# Patient Record
Sex: FEMALE | Race: BLACK OR AFRICAN AMERICAN | NOT HISPANIC OR LATINO | URBAN - METROPOLITAN AREA
[De-identification: names, ages, dates, MRNs, and addresses within clinical notes are randomized per-mention and may not be internally consistent; named-entity substitution may affect disease eponyms.]

---

## 2023-12-26 ENCOUNTER — EMERGENCY (EMERGENCY)
Facility: HOSPITAL | Age: 68
LOS: 0 days | Discharge: HOME | End: 2023-12-26
Payer: SELF-PAY

## 2023-12-26 VITALS
DIASTOLIC BLOOD PRESSURE: 98 MMHG | SYSTOLIC BLOOD PRESSURE: 175 MMHG | TEMPERATURE: 98 F | RESPIRATION RATE: 16 BRPM | OXYGEN SATURATION: 100 % | HEART RATE: 80 BPM | WEIGHT: 147.05 LBS | HEIGHT: 63 IN

## 2023-12-26 DIAGNOSIS — S00.81XA ABRASION OF OTHER PART OF HEAD, INITIAL ENCOUNTER: ICD-10-CM

## 2023-12-26 DIAGNOSIS — S00.31XA ABRASION OF NOSE, INITIAL ENCOUNTER: ICD-10-CM
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
## 2023-12-26 DIAGNOSIS — R51.9 HEADACHE, UNSPECIFIED: ICD-10-CM

## 2023-12-26 DIAGNOSIS — Z23 ENCOUNTER FOR IMMUNIZATION: ICD-10-CM

## 2023-12-26 PROCEDURE — 70450 CT HEAD/BRAIN W/O DYE: CPT | Mod: 26,MA

## 2023-12-26 PROCEDURE — 99284 EMERGENCY DEPT VISIT MOD MDM: CPT

## 2023-12-26 PROCEDURE — 70486 CT MAXILLOFACIAL W/O DYE: CPT | Mod: 26,MA

## 2023-12-26 RX ORDER — ACETAMINOPHEN 500 MG
650 TABLET ORAL ONCE
Refills: 0 | Status: COMPLETED | OUTPATIENT
Start: 2023-12-26 | End: 2023-12-26

## 2023-12-26 RX ORDER — TETANUS TOXOID, REDUCED DIPHTHERIA TOXOID AND ACELLULAR PERTUSSIS VACCINE, ADSORBED 5; 2.5; 8; 8; 2.5 [IU]/.5ML; [IU]/.5ML; UG/.5ML; UG/.5ML; UG/.5ML
0.5 SUSPENSION INTRAMUSCULAR ONCE
Refills: 0 | Status: COMPLETED | OUTPATIENT
Start: 2023-12-26 | End: 2023-12-26

## 2023-12-26 RX ADMIN — TETANUS TOXOID, REDUCED DIPHTHERIA TOXOID AND ACELLULAR PERTUSSIS VACCINE, ADSORBED 0.5 MILLILITER(S): 5; 2.5; 8; 8; 2.5 SUSPENSION INTRAMUSCULAR at 17:43

## 2023-12-26 RX ADMIN — Medication 650 MILLIGRAM(S): at 17:44

## 2023-12-26 NOTE — ED PROVIDER NOTE - NSFOLLOWUPINSTRUCTIONS_ED_ALL_ED_FT
Rest, drink plenty of fluids.  Advance activity as tolerated.  Continue all previously prescribed medications as directed.  Follow up with your primary care physician in 48-72 hours- bring copies of your results.  Return to the ER for worsening or persistent symptoms, and/or ANY NEW OR CONCERNING SYMPTOMS. If you have issues obtaining follow up, please call: [phone] to obtain a doctor or specialist who takes your insurance in your area.  You may call [phone] to make an appointment with the internal medicine clinic. Rest, drink plenty of fluids.  Advance activity as tolerated.  Continue all previously prescribed medications as directed.  Follow up with your primary care physician in 48-72 hours- bring copies of your results.  Return to the ER for worsening or persistent symptoms, and/or ANY NEW OR CONCERNING SYMPTOMS. If you have issues obtaining follow up, please call: [phone] to obtain a doctor or specialist who takes your insurance in your area.  You may call [phone] to make an appointment with the internal medicine clinic.

## 2023-12-26 NOTE — ED PROVIDER NOTE - OBJECTIVE STATEMENT
69 y/o female with no PMH here with head/facial injury s/p mechanical fall today. Pt states she slip and fell forward hitting her face. Denies LOC. Pt reports having headache. Pt states she had some nose bleeding but it has resolved. Denies dizziness, nausea, vomiting, vision changes or other injuries. Pt is not on any blood thinners. Pt denies chest pain, dyspnea, abdominal pain.

## 2023-12-26 NOTE — ED PROVIDER NOTE - CLINICAL SUMMARY MEDICAL DECISION MAKING FREE TEXT BOX
67 y/o female with no PMH s/p mechanical fall today with head/facial injury. Vs stable.   Will obtain ct head and ct facial r/o bleed or fracture and update tetanus and tylenol ordered for pain. 69 y/o female with no PMH s/p mechanical fall today with head/facial injury. Vs stable.   Will obtain ct head and ct facial r/o bleed or fracture and update tetanus and tylenol ordered for pain. 67 y/o female with no PMH s/p mechanical fall today with head/facial injury. Vs stable.   Will obtain ct head and ct facial r/o bleed or fracture and update tetanus and tylenol ordered for pain.    ct head and ct facial no acute findings.     Abrasion was cleaned and bacitracin applied.   Pt NAD, ambulatory with steady gait. No neurological deficits.   pt stable to be discharged home and follow up with PMD .

## 2023-12-26 NOTE — ED ADULT NURSE NOTE - OBJECTIVE STATEMENT
as per patient " today I fell on the side walk, hit my head and face. Denies LOC, noted swelling, redness and abrasion to forehead, nose, and above upper lip"

## 2023-12-26 NOTE — ED PROVIDER NOTE - PHYSICAL EXAMINATION
GEN: Awake, alert, interactive, NAD.  HEAD AND NECK: NC/AT. Airway patent. Neck supple. No c spine tenderness.  (+) mild swelling to the forehead with abrasion   EYES:  Clear b/l. EOMI. PERRL.   ENT: Moist mucus membranes. Nose: (+) mild nasal swelling, (-) active bleeding, (-) septal hematoma. Mouth: Teeth immobile and intact.   CARDIAC: Regular rate, regular rhythm. No evident pedal edema.    RESP/CHEST: Normal respiratory effort with no use of accessory muscles or retractions. Clear throughout on auscultation.  ABD: soft, non-distended, non-tender. No rebound, no guarding.   BACK: No midline spinal TTP. No CVAT.   EXTREMITIES: Moving all extremities with no apparent deformities.   SKIN: Warm, dry, intact normal color. No rash.   NEURO: AOx3, CN II-XII grossly intact, no focal deficits. Ambulatory with steady gait.   PSYCH: Appropriate mood and affect. GEN: Awake, alert, interactive, NAD.  HEAD AND NECK: NC/AT. Airway patent. Neck supple. No c spine tenderness.  (+) mild swelling to the forehead with abrasions   EYES:  Clear b/l. EOMI. PERRL.   ENT: Moist mucus membranes. Nose: (+) mild nasal swelling with abrasions, (-) active bleeding, (-) septal hematoma. Mouth: Teeth immobile and intact.   CARDIAC: Regular rate, regular rhythm. No evident pedal edema.    RESP/CHEST: Normal respiratory effort with no use of accessory muscles or retractions. Clear throughout on auscultation.  ABD: soft, non-distended, non-tender. No rebound, no guarding.   BACK: No midline spinal TTP. No CVAT.   EXTREMITIES: Moving all extremities with no apparent deformities.   SKIN: Warm, dry, intact normal color. No rash.   NEURO: AOx3, CN II-XII grossly intact, no focal deficits. Ambulatory with steady gait.   PSYCH: Appropriate mood and affect.

## 2023-12-26 NOTE — ED ADULT NURSE NOTE - NSFALLUNIVINTERV_ED_ALL_ED
Bed/Stretcher in lowest position, wheels locked, appropriate side rails in place/Call bell, personal items and telephone in reach/Instruct patient to call for assistance before getting out of bed/chair/stretcher/Non-slip footwear applied when patient is off stretcher/[place] to call system/Physically safe environment - no spills, clutter or unnecessary equipment/Purposeful proactive rounding/Room/bathroom lighting operational, light cord in reach Bed/Stretcher in lowest position, wheels locked, appropriate side rails in place/Call bell, personal items and telephone in reach/Instruct patient to call for assistance before getting out of bed/chair/stretcher/Non-slip footwear applied when patient is off stretcher/[place] to call system/Physically safe environment - no spills, clutter or unnecessary equipment/Purposeful proactive rounding/Room/bathroom lighting operational, light cord in reach

## 2024-04-26 NOTE — ED PROVIDER NOTE - NSICDXPASTMEDICALHX_GEN_ALL_CORE_FT
Group Topic: BH Process Group     Date: 4/26/2024  Start Time: 1300  End Time: 1400  Facilitators: Sakina Kraft, KOLEW    Focus:  Process \"[place]\"   Number in attendance: 2    Pt did not attend group and was observed sleeping.    PAST MEDICAL HISTORY:  No pertinent past medical history

## 2024-12-09 NOTE — ED ADULT TRIAGE NOTE - WEIGHT IN KG
"Acupuncture Visit:     Please note: Dictation software was used while completing this note and may contain spelling, grammatical, and/or syntax errors.  Please contact me with any questions.    To clinicians, I am always happy to partner with you in the care of your patients. Do not hesitate to call the office or contact me directly regarding any further consultations or with questions regarding the plan of care outlined or patient progress.    Subjective   Patient ID: Tasneem Daly is a 57 y.o. female who presents for Neck Pain and Migraine    Insurance visit 3 of 30    Patient reports continued neck soreness; she notes having \"a couple\" of migraines over the past week, made worse by stress and weather      Initial intake (11/04/2024 )    Neck pain and low back pain    Neck  \"A while\"; pain bilateral; ROM issues  Described as \"dull\", \"heavy\", \"achy\"  Worse: driving, stress  Better: massage gun, ice/heat    Hx of migraine, mainly frontal; no medications; B knee pain; B tinnitus    Back  \"A long time\"; located \"in the middle\"; occasional referral pain down B legs  Better: Ice/heat  Worse: driving/sitting for extended periods    Back Pain  This is a chronic problem. The pain is present in the gluteal. The quality of the pain is described as aching and stabbing. The symptoms are aggravated by sitting, stress and position. Stiffness is present All day. Associated symptoms include headaches.   Neck Pain   This is a chronic problem. The pain is present in the midline, left side, right side and occipital region. The quality of the pain is described as aching and stabbing. The symptoms are aggravated by position and stress. Stiffness is present All day. Associated symptoms include headaches.   Migraine   Associated symptoms include back pain and neck pain.       Session Information  Is this acupuncture treatment being billed to the patient's insurance company: Yes  This is visit number: 3  The patient has a total number " of visits of: 30  Initial Acupuncture Treatment date: 11/04/24  Last Treatment date: 12/02/24  Name of Insurance Company: [employer]  Visit Type: Follow-up visit  Medical History Reviewed: I have reviewed pertinent medical history in EHR, and no contraindications are present to provide treatment  Description of present complaint: Muscle tension, Myofascial pain, Headache         Review of Systems   Musculoskeletal:  Positive for back pain and neck pain.   Neurological:  Positive for headaches.            Provider reviewed plan for the acupuncture session, precautions and contraindications. Patient/guardian/hospital staff has given consent to treat with full understanding of what to expect during the session. Before acupuncture began, provider explained to the patient to communicate at any time if the procedure was causing discomfort past their tolerance level. Patient agreed to advise acupuncturist. The acupuncturist counseled the patient on the risks of acupuncture treatment including pain, infection, bleeding, and no relief of pain. The patient was positioned comfortably. There was no evidence of infection at the site of needle insertions.    Objective   Physical Exam         Treatment Plan  Treatment Goals: Pain management, Wellbeing improvement    Acupuncture Treatment  Patient Position: Lateral recumbent on a table  Needle Guage: 36 guage /.20/ Blue seirin  Body Points - Left: KD3  Body Points - Bilateral: LIV3, BL23, BL52, thoracic paraspinals x3  Body Points - Right: BL62, GB41  Other Techniques Utilized: Topicals  Topicals Description: Posumon oil  Needle Count In: 15  Needle Count Out: 15  Needle Retention Time (min): 25 minutes  Total Face to Face Time (min): 20 minutes              Assessment/Plan        66.7